# Patient Record
Sex: FEMALE | Race: WHITE
[De-identification: names, ages, dates, MRNs, and addresses within clinical notes are randomized per-mention and may not be internally consistent; named-entity substitution may affect disease eponyms.]

---

## 2018-04-18 ENCOUNTER — HOSPITAL ENCOUNTER (EMERGENCY)
Dept: HOSPITAL 59 - ER | Age: 43
LOS: 1 days | Discharge: HOME | End: 2018-04-19
Payer: COMMERCIAL

## 2018-04-18 DIAGNOSIS — R10.13: ICD-10-CM

## 2018-04-18 DIAGNOSIS — R11.2: Primary | ICD-10-CM

## 2018-04-18 DIAGNOSIS — R19.7: ICD-10-CM

## 2018-04-18 DIAGNOSIS — R51: ICD-10-CM

## 2018-04-18 PROCEDURE — 80053 COMPREHEN METABOLIC PANEL: CPT

## 2018-04-18 PROCEDURE — 96375 TX/PRO/DX INJ NEW DRUG ADDON: CPT

## 2018-04-18 PROCEDURE — 81001 URINALYSIS AUTO W/SCOPE: CPT

## 2018-04-18 PROCEDURE — 81025 URINE PREGNANCY TEST: CPT

## 2018-04-18 PROCEDURE — 85025 COMPLETE CBC W/AUTO DIFF WBC: CPT

## 2018-04-18 PROCEDURE — 83690 ASSAY OF LIPASE: CPT

## 2018-04-18 PROCEDURE — 99284 EMERGENCY DEPT VISIT MOD MDM: CPT

## 2018-04-18 PROCEDURE — 96361 HYDRATE IV INFUSION ADD-ON: CPT

## 2018-04-18 PROCEDURE — 96365 THER/PROPH/DIAG IV INF INIT: CPT

## 2018-04-19 LAB
ALBUMIN SERPL-MCNC: 4.7 G/DL (ref 4–5)
ALBUMIN/GLOB SERPL: 1.7 {RATIO} (ref 1.1–1.8)
ALP SERPL-CCNC: 64 U/L (ref 35–104)
ALT SERPL-CCNC: 15 U/L (ref ?–33)
ANION GAP SERPL CALC-SCNC: 14 MMOL/L (ref 7–16)
APPEARANCE UR: CLEAR
AST SERPL-CCNC: 14 U/L (ref 10–35)
BACTERIA #/AREA URNS HPF: (no result) /[HPF]
BASOPHILS NFR BLD: 0.2 % (ref 0–6)
BILIRUB SERPL-MCNC: 0.5 MG/DL (ref 0.2–1)
BILIRUB UR-MCNC: NEGATIVE MG/DL
BUN SERPL-MCNC: 12 MG/DL (ref 6–20)
CO2 SERPL-SCNC: 25 MMOL/L (ref 22–29)
COLOR UR: YELLOW
CREAT SERPL-MCNC: 0.8 MG/DL (ref 0.5–0.9)
EOSINOPHIL NFR BLD: 2.5 % (ref 0–6)
EPI CELLS #/AREA URNS HPF: (no result) /[HPF]
ERYTHROCYTE [DISTWIDTH] IN BLOOD BY AUTOMATED COUNT: 14.1 % (ref 11.5–14.5)
EST GLOMERULAR FILTRATION RATE: > 60 ML/MIN
GLOBULIN SER-MCNC: 2.7 GM/DL (ref 1.4–4.8)
GLUCOSE SERPL-MCNC: 102 MG/DL (ref 74–109)
GLUCOSE UR STRIP-MCNC: NEGATIVE MG/DL
GRANULOCYTES NFR BLD: 73.9 % (ref 47–80)
HCG,QUALITATIVE URINE: NEGATIVE
HCT VFR BLD CALC: 46.3 % (ref 35–47)
HGB BLD-MCNC: 15.3 GM/DL (ref 11.6–16)
KETONES UR QL STRIP: NEGATIVE
LIPASE SERPL-CCNC: 63 U/L (ref 13–60)
LYMPHOCYTES NFR BLD AUTO: 15.3 % (ref 16–45)
MCH RBC QN AUTO: 28 PG (ref 27–33)
MCHC RBC AUTO-ENTMCNC: 33 G/DL (ref 32–36)
MCV RBC AUTO: 84.8 FL (ref 81–97)
MONOCYTES NFR BLD: 8.1 % (ref 0–9)
NITRITE UR QL STRIP: NEGATIVE
PLATELET # BLD: 297 K/UL (ref 130–400)
PMV BLD AUTO: 9.8 FL (ref 7.4–10.4)
PROT SERPL-MCNC: 7.4 G/DL (ref 6.6–8.7)
PROT UR QL STRIP: NEGATIVE
RBC # BLD AUTO: 5.46 M/UL (ref 3.8–5.4)
RBC # UR STRIP: (no result) /UL
RBC #/AREA URNS HPF: (no result) /[HPF]
URINE LEUKOCYTE ESTERASE: NEGATIVE
UROBILINOGEN UR STRIP-ACNC: 0.2 E.U./DL (ref 0.2–1)
WBC # BLD AUTO: 13.8 K/UL (ref 4.2–12.2)
WBC #/AREA URNS HPF: (no result) /[HPF]

## 2018-04-19 NOTE — EMERGENCY DEPARTMENT RECORD
History of Present Illness





- General


Chief complaint: Nausea, Vomiting, Diarrhea


Stated complaint: DIARRHEA,VOMITING


Time Seen by Provider: 04/18/18 23:47


Source: Patient


Mode of Arrival: Ambulatory


Limitations: No limitations





- History of Present Illness


Initial comments: 





43 yo female presents to ED for evaluation of nausea and loose stools for the 

past 15 hours.  Patient denies fevers/chills or cough symptoms, reports 

abdominal "cramping" symptoms.  Patient reports a history of GERD, report that 

her epigastric cramping symptoms are similar to previous episodes of GERD.  

Patient denies other health problems, report history of hysterectomy and tubal 

ligation previously.  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 

with several patients with similar symptoms. 


MD complaint: Abdominal pain, Nausea


Onset/Timing: 15


-: Hour(s)


Description of Vomiting: Other


Description of Diarrhea: Water


Associated Abdominal Pain: Yes


Location: Epigastric


Radiation: Back


Severity scale (1-10): 7


Quality: Cramping


Consistency: Constant


Associated Symptoms: Headaches, Nausea/vomiting





- Related Data


 Previous Rx's











 Medication  Instructions  Recorded


 


Hyoscyamine Sulfate [Levsin-Sl] 0.25 mg SL Q8H PRN #15 tab.subl 04/19/18


 


Ondansetron [Zofran Odt] 4 mg PO Q8H PRN #15 tab.rapdis 04/19/18











 Allergies











Allergy/AdvReac Type Severity Reaction Status Date / Time


 


promethazine HCl Allergy  MUSCLE PAIN Verified 04/18/18 23:40





[From Phenergan]     














Travel Screening





- Travel/Exposure Within Last 30 Days


Have you traveled within the last 30 days?: No





- Travel Symptoms


Symptom Screening: None





Review of Systems


Constitutional: Denies: Chills, Fever, Malaise, Night sweats


Eyes: Denies: Eye discharge, Eye pain


ENT: Denies: Congestion, Ear pain


Respiratory: Denies: Cough, Dyspnea


Cardiovascular: Denies: Chest pain, Dyspnea on exertion


Endocrine: Denies: Fatigue, Heat or cold intolerance


Gastrointestinal: Reports: Abdominal pain, Diarrhea, Nausea.  Denies: Vomiting


Genitourinary: Denies: Incontinence, Retention


Musculoskeletal: Denies: Arthralgia, Back pain


Skin: Denies: Bruising, Change in color


Neurological: Denies: Abnormal gait, Confusion, Headache, Seizure


Psychiatric: Denies: Anxiety


Hematological/Lymphatic: Denies: Anemia, Blood Clots





Past Medical History





- SOCIAL HISTORY


Smoking Status: Never smoker





- RESPIRATORY


Hx Respiratory Disorders: No





- CARDIOVASCULAR


Hx Cardio Disorders: No


Hx Chest Pain: Yes





- NEURO


Hx Neuro Disorders: Yes


Hx Headaches: Yes





- GI


Hx GI Disorders: Yes


Hx Reflux: Yes


Comment:: Gastroporesis





- 


Hx Genitourinary Disorders: No





- ENDOCRINE


Hx Endocrine Disorders: No





- MUSCULOSKELETAL


Hx Musculoskeletal Disorders: No





- PSYCH


Hx Psych Problems: Yes


Hx Depression: Yes





- HEMATOLOGY/ONCOLOGY


Hx Hematology/Oncology Disorders: No





Family Medical History


Any Significant Family History?: Yes


Hx Cancer: Grandparents


Hx Diabetes: Grandparents


Hx Heart Disease: Father


Hx Resp Disorders: Mother





Physical Exam





- General


General Appearance: Alert, Oriented x3, Cooperative, Moderate distress


Limitations: No limitations





- Head


Head exam: Atraumatic, Normocephalic, Normal inspection


Head exam detail: negative: Abrasion, Contusion, Tran's sign, General 

tenderness, Hematoma, Laceration





- Eye


Eye exam: Normal appearance.  negative: Conjunctival injection, Periorbital 

swelling, Periorbital tenderness, Scleral icterus





- ENT


Ear exam: negative: Auricular hematoma, Auricular trauma


Nasal Exam: negative: Active bleeding, Discharge, Dried blood, Foreign body


Mouth exam: negative: Drooling, Laceration, Muffled voice, Tongue elevation





- Neck


Neck exam: Normal inspection.  negative: Meningismus, Tenderness





- Respiratory


Respiratory exam: Normal lung sounds bilaterally.  negative: Rales, Respiratory 

distress, Rhonchi, Stridor





- Cardiovascular


Cardiovascular Exam: Regular rate, Normal rhythm, Normal heart sounds





- GI/Abdominal


GI/Abdominal exam: Soft, Tenderness (Moderate TTP epigastric region, no rebound 

or guarding symptoms are present).  negative: Rebound, Rigid





- Rectal


Rectal exam: Deferred





- 


 exam: Deferred





- Extremities


Extremities exam: Normal inspection.  negative: Calf tenderness, Pedal edema, 

Tenderness





- Back


Back exam: Denies: CVA tenderness (R), CVA tenderness (L)





- Neurological


Neurological exam: Alert, Normal gait, Oriented X3





- Psychiatric


Psychiatric exam: Normal affect, Normal mood





- Skin


Skin exam: Normal color.  negative: Abrasion


Type of lesion: negative: abrasion





Course





 Vital Signs











  04/18/18





  23:43


 


Temperature 97.9 F


 


Pulse Rate [ 87





Pulse Ox Probe] 


 


Respiratory 16





Rate 


 


Blood Pressure 116/74





[Left Arm] 


 


Pulse Ox 99














- Reevaluation(s)


Reevaluation #1: 





04/19/18 00:22


Labs reviewed, WBC 13.8, mild elevation Lipase.  Labs are otherwise grossly 

unremarkable for an acute process.


Reevaluation #2: 





04/19/18 02:14


Patient was reassessed, reports that her symptoms are improving.  Repeat 

abdominal examination is benign, and CT imaging does not appear indicated as 

there is no evidence for a surgical process on examination.  Loose stools are 

likely the result of a virus, and will likely improve3 over the next 12-24 

hours.  Will discharge on Levsin in addition to the Zofran that she has at home.











Medical Decision Making





- Lab Data


Result diagrams: 


 04/18/18 23:51





 04/18/18 23:51





Disposition


Disposition: Discharge


Clinical Impression: 


 Nausea vomiting and diarrhea





Disposition: Home, Self-Care


Condition: (2) Stable


Instructions:  Acute Diarrhea (ED)


Additional Instructions: 


Return to ED if your symptoms worsen or if you have any concerns.


Levsin and Zofran as directed.


Follow-up with your family doctor in 3-5 days as directed.


Prescriptions: 


Hyoscyamine Sulfate [Levsin-Sl] 0.25 mg SL Q8H PRN #15 tab.subl


 PRN Reason: Abdominal Pain


Ondansetron [Zofran Odt] 4 mg PO Q8H PRN #15 tab.rapdis


 PRN Reason: Nausea/Vomiting


Forms:  Patient Portal Access


Time of Disposition: 02:16





Quality





- Quality Measures


Quality Measures: N/A





- Blood Pressure Screening


Does Patient Have Any of the Following: No


Blood Pressure Classification: Normal BP Reading


Systolic Measurement: 99


Diastolic Measurement: 66


Screening for High Blood Pressure: < Normal BP, F/U Not Required > []